# Patient Record
Sex: FEMALE | Race: WHITE | NOT HISPANIC OR LATINO
[De-identification: names, ages, dates, MRNs, and addresses within clinical notes are randomized per-mention and may not be internally consistent; named-entity substitution may affect disease eponyms.]

---

## 2020-08-31 ENCOUNTER — TRANSCRIPTION ENCOUNTER (OUTPATIENT)
Age: 55
End: 2020-08-31

## 2020-11-21 ENCOUNTER — TRANSCRIPTION ENCOUNTER (OUTPATIENT)
Age: 55
End: 2020-11-21

## 2021-11-10 ENCOUNTER — TRANSCRIPTION ENCOUNTER (OUTPATIENT)
Age: 56
End: 2021-11-10

## 2022-07-08 ENCOUNTER — APPOINTMENT (OUTPATIENT)
Dept: ORTHOPEDIC SURGERY | Facility: CLINIC | Age: 57
End: 2022-07-08

## 2022-07-08 VITALS — HEIGHT: 65 IN | BODY MASS INDEX: 19.99 KG/M2 | WEIGHT: 120 LBS

## 2022-07-08 DIAGNOSIS — Z78.9 OTHER SPECIFIED HEALTH STATUS: ICD-10-CM

## 2022-07-08 PROBLEM — Z00.00 ENCOUNTER FOR PREVENTIVE HEALTH EXAMINATION: Status: ACTIVE | Noted: 2022-07-08

## 2022-07-08 PROCEDURE — 99214 OFFICE O/P EST MOD 30 MIN: CPT | Mod: 25

## 2022-07-08 PROCEDURE — 23600 CLTX PROX HUMRL FX W/O MNPJ: CPT

## 2022-07-08 RX ORDER — OXYCODONE AND ACETAMINOPHEN 5; 325 MG/1; MG/1
5-325 TABLET ORAL
Refills: 0 | Status: ACTIVE | COMMUNITY
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
## 2022-07-08 NOTE — ASSESSMENT
[FreeTextEntry1] : NWB right UE \par Use sling\par Come out of sling a few times a day to perform elbow and wrist ROM to prevent stiffness \par Follow up in 1 month

## 2022-07-08 NOTE — IMAGING
[Right] : right shoulder [Fracture] : Fracture [The fracture is in acceptable alignment. There is progression in healing seen] : The fracture is in acceptable alignment. There is progression in healing seen

## 2022-08-02 ENCOUNTER — APPOINTMENT (OUTPATIENT)
Dept: ORTHOPEDIC SURGERY | Facility: CLINIC | Age: 57
End: 2022-08-02

## 2022-08-02 VITALS — HEIGHT: 65 IN | WEIGHT: 120 LBS | BODY MASS INDEX: 19.99 KG/M2

## 2022-08-02 PROCEDURE — 99024 POSTOP FOLLOW-UP VISIT: CPT

## 2022-08-02 PROCEDURE — 73030 X-RAY EXAM OF SHOULDER: CPT | Mod: RT

## 2022-08-02 RX ORDER — TRAMADOL HYDROCHLORIDE 50 MG/1
50 TABLET, COATED ORAL DAILY
Qty: 10 | Refills: 0 | Status: ACTIVE | COMMUNITY
Start: 2022-08-02 | End: 1900-01-01

## 2022-08-02 NOTE — PHYSICAL EXAM
[] : motor and sensory intact distally [Right] : right shoulder [The fracture is in acceptable alignment. There is progression in healing seen] : The fracture is in acceptable alignment. There is progression in healing seen [FreeTextEntry9] : A [de-identified] : Strength testing limited due to Fx/immobilization  [TWNoteComboBox7] : active forward flexion 45 degrees [TWNoteComboBox6] : internal rotation lateral hip [de-identified] : external rotation 10 degrees

## 2022-08-02 NOTE — DISCUSSION/SUMMARY
[de-identified] : Patient shown HEP including passive arm circles. Patient can begin PT in 1 week working on passive ROM. Take NSAIDs and refrain from lifting. wear sling in public spaces.  \par \par f/u 4 weeks
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
## 2022-08-11 ENCOUNTER — APPOINTMENT (OUTPATIENT)
Dept: ORTHOPEDIC SURGERY | Facility: CLINIC | Age: 57
End: 2022-08-11

## 2022-08-18 ENCOUNTER — FORM ENCOUNTER (OUTPATIENT)
Age: 57
End: 2022-08-18

## 2022-08-18 RX ORDER — OXYCODONE AND ACETAMINOPHEN 5; 325 MG/1; MG/1
5-325 TABLET ORAL
Qty: 28 | Refills: 0 | Status: ACTIVE | COMMUNITY
Start: 2022-08-18 | End: 1900-01-01

## 2022-09-08 ENCOUNTER — APPOINTMENT (OUTPATIENT)
Dept: ORTHOPEDIC SURGERY | Facility: CLINIC | Age: 57
End: 2022-09-08

## 2022-09-08 PROCEDURE — 73030 X-RAY EXAM OF SHOULDER: CPT | Mod: RT

## 2022-09-08 RX ORDER — MELOXICAM 15 MG/1
15 TABLET ORAL
Qty: 30 | Refills: 0 | Status: ACTIVE | COMMUNITY
Start: 2022-09-08 | End: 1900-01-01
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
## 2022-09-08 NOTE — PHYSICAL EXAM
[Right] : right shoulder [The fracture is in acceptable alignment. There is progression in healing seen] : The fracture is in acceptable alignment. There is progression in healing seen [4 ___] : forward flexion 4[unfilled]/5 [4___] : internal rotation 4[unfilled]/5 [] : no scapular winging [TWNoteComboBox7] : active forward flexion 130 degrees [TWNoteComboBox6] : internal rotation L4 [de-identified] : external rotation 40 degrees

## 2022-09-08 NOTE — DISCUSSION/SUMMARY
[Medication Risks Reviewed] : Medication risks reviewed [Surgical risks reviewed] : Surgical risks reviewed [de-identified] : Discussed importance of non-impact exercise and muscle stretching before and after exercise.  Explained the importance of ice and rest.\par continue PT

## 2022-10-11 ENCOUNTER — APPOINTMENT (OUTPATIENT)
Dept: ORTHOPEDIC SURGERY | Facility: CLINIC | Age: 57
End: 2022-10-11

## 2022-10-11 VITALS — HEIGHT: 65 IN | BODY MASS INDEX: 19.99 KG/M2 | WEIGHT: 120 LBS

## 2022-10-11 PROCEDURE — 99213 OFFICE O/P EST LOW 20 MIN: CPT

## 2022-10-11 NOTE — DISCUSSION/SUMMARY
[de-identified] : Discussed importance of non-impact exercise and muscle stretching before and after exercise. Explained the importance of ice/heat and rest. Patient will transition PT exercises into HEP - increase ROM and strength in the upper extremity. F/u in 6 weeks.

## 2022-10-11 NOTE — REASON FOR VISIT
[FreeTextEntry2] : here today for f/u right shoulder fx 7/7/22.  going to PT off and on d/t financial reasons

## 2022-10-11 NOTE — PHYSICAL EXAM
[5 ___] : forward flexion 5[unfilled]/5 [5___] : internal rotation 5[unfilled]/5 [Right] : right shoulder [The fracture is in acceptable alignment. There is progression in healing seen] : The fracture is in acceptable alignment. There is progression in healing seen [] : no scapular winging [FreeTextEntry9] : forward elevating 150  [de-identified] : biceps 5-/5 [TWNoteComboBox7] : False [TWNoteComboBox6] : internal rotation L5 [de-identified] : external rotation 40 degrees

## 2022-11-22 ENCOUNTER — APPOINTMENT (OUTPATIENT)
Dept: ORTHOPEDIC SURGERY | Facility: CLINIC | Age: 57
End: 2022-11-22

## 2022-11-22 VITALS — WEIGHT: 120 LBS | HEIGHT: 65 IN | BODY MASS INDEX: 19.99 KG/M2

## 2022-11-22 PROCEDURE — 99213 OFFICE O/P EST LOW 20 MIN: CPT

## 2022-11-22 NOTE — REASON FOR VISIT
[FreeTextEntry2] : here today for f/u right shoulder fx 7/7/22.  has been doing exercises on her own . here for motion check

## 2022-11-22 NOTE — DISCUSSION/SUMMARY
[de-identified] : Discussed importance of non-impact exercise and muscle stretching before and after exercise. Explained the importance of ice/heat and rest. Patient has transitioned PT exercises into HEP - she will continue to work to increase ROM and strength in the upper extremity. \par Demonstrated wall exercises to benefit the shoulder. Advised her to take the exercises slow.\par F/u 6-8 weeks

## 2022-11-22 NOTE — PHYSICAL EXAM
[5___] : right grasp 5[unfilled]/5 [Right] : right shoulder [4 ___] : forward flexion 4[unfilled]/5 [4___] : internal rotation 4[unfilled]/5 [] : no scapular winging [FreeTextEntry8] : tender over levito scapula up into the neck [FreeTextEntry9] : passive IR L1; active IR L4 [de-identified] : biceps 4-/5 [TWNoteComboBox7] : active forward flexion 150 degrees [TWNoteComboBox6] : internal rotation L4 [de-identified] : external rotation 70 degrees

## 2023-01-17 ENCOUNTER — APPOINTMENT (OUTPATIENT)
Dept: ORTHOPEDIC SURGERY | Facility: CLINIC | Age: 58
End: 2023-01-17
Payer: COMMERCIAL

## 2023-01-17 VITALS — BODY MASS INDEX: 19.99 KG/M2 | HEIGHT: 65 IN | WEIGHT: 120 LBS

## 2023-01-17 DIAGNOSIS — S42.291A OTHER DISPLACED FRACTURE OF UPPER END OF RIGHT HUMERUS, INITIAL ENCOUNTER FOR CLOSED FRACTURE: ICD-10-CM

## 2023-01-17 PROCEDURE — 99213 OFFICE O/P EST LOW 20 MIN: CPT

## 2023-01-17 NOTE — REASON FOR VISIT
[FreeTextEntry2] : here today for f/u right shoulder fx 7/7/22.  has been doing HEP and going to gym.  c/o some stiffness

## 2023-01-17 NOTE — PHYSICAL EXAM
[Right] : right shoulder [4 ___] : forward flexion 4[unfilled]/5 [4___] : abduction 4[unfilled]/5 [5___] : internal rotation 5[unfilled]/5 [] : no scapular winging [FreeTextEntry8] : tender over levitor scapula up into the neck [FreeTextEntry9] :  active IR T9 [de-identified] : biceps 4-/5 [TWNoteComboBox7] : active forward flexion 175 degrees [TWNoteComboBox6] : False [de-identified] : external rotation 80 degrees

## 2023-04-18 ENCOUNTER — APPOINTMENT (OUTPATIENT)
Dept: ORTHOPEDIC SURGERY | Facility: CLINIC | Age: 58
End: 2023-04-18

## 2024-06-14 ENCOUNTER — TRANSCRIPTION ENCOUNTER (OUTPATIENT)
Age: 59
End: 2024-06-14

## 2024-06-17 ENCOUNTER — APPOINTMENT (OUTPATIENT)
Dept: ORTHOPEDIC SURGERY | Facility: CLINIC | Age: 59
End: 2024-06-17

## 2025-06-10 ENCOUNTER — APPOINTMENT (OUTPATIENT)
Facility: CLINIC | Age: 60
End: 2025-06-10
Payer: MEDICAID

## 2025-06-10 PROBLEM — I47.20 VENTRICULAR TACHYCARDIA BY ELECTROCARDIOGRAM: Status: RESOLVED | Noted: 2025-06-10 | Resolved: 2025-06-10

## 2025-06-10 PROBLEM — R73.03 PRE-DIABETES: Status: ACTIVE | Noted: 2025-06-10

## 2025-06-10 PROCEDURE — 93000 ELECTROCARDIOGRAM COMPLETE: CPT

## 2025-06-10 PROCEDURE — 99203 OFFICE O/P NEW LOW 30 MIN: CPT | Mod: 25

## 2025-06-10 RX ORDER — AMIODARONE HYDROCHLORIDE 200 MG/1
200 TABLET ORAL EVERY OTHER DAY
Refills: 0 | Status: ACTIVE | COMMUNITY

## 2025-06-11 RX ORDER — METOPROLOL TARTRATE 25 MG/1
25 TABLET ORAL
Qty: 30 | Refills: 0 | Status: ACTIVE | COMMUNITY
Start: 2025-06-11 | End: 1900-01-01

## 2025-06-30 NOTE — H&P PST ADULT - OTHER CARE PROVIDERS
Steve Turner ([facility] [place], [address], [place] [address], Phone: [phone], Fax: [phone]), Moises Sarmiento ([facility], [address], [place] [address], [phone], Fax: [phone])

## 2025-06-30 NOTE — H&P PST ADULT - HISTORY OF PRESENT ILLNESS
Heart Failure:        Systolic/Diastolic/Combined:        NYHA Class (within 2 weeks):     Echo: May 27 2025  ·	LEFT VENTRICLE: Normal size left ventricle. Normal global left ventricular systolic function. EF evaluated by EF (biplane method of disks). Biplane LVEF is calculated at 64 %. The LV wall thickness is normal. The left ventricular wall motion is normal. Normal diastolic LV function.  ·	IVS: Interventricular septal thickness is normal.  ·	RIGHT VENTRICLE: Normal size right ventricle. The right ventricular global function is normal. Right ventricular systolic pressure is within the normal range. TAPSE: 3.0 cm.  ·	LEFT ATRIUM: Normal size left atrium. LADs, MM: 2.4 cm. LADs, 2D: 2.8 cm.  ·	IAS: Normal appearing atrial septum. There is no color doppler flow evidence for an ASD or PFO.  ·	RIGHT ATRIUM: Normal size right atrium.  ·	MITRAL VALVE: There is mild mitral valve thickening. Trace mitral valve regurgitation. No mitral valve stenosis.  ·	AORTIC VALVE: The aortic valve is tricuspid, thickened and has normal leaflet excursion . No aortic valve regurgitation. No aortic valve stenosis.  ·	TRICUSPID VALVE: There is mild tricuspid valve thickening. Trace tricuspid valve regurgitation. RVSP: 20 mmHg.  ·	PULMONIC VALVE: The pulmonic valve appears normal in structure and function. No significant pulmonary valve regurgitation.  ·	AORTA: No dilatation of the aorta. The aortic root exhibits normal size. Ascending aorta is normal in size.  ·	GREAT VESSELS: Pulmonary Artery: The pulmonary artery morphology appears normal. IVC: IVC is dilated. There is inspiratory collapse greater than 50% of the IVC.  ·	PERICARDIUM: There is no pericardial effusion. No pleural effusion.    Prior EP Procedures:    Prior IC Procedures: 05/28/2025  Coronary Angiography  ·	LM- patent  ·	LAD- ostial 30% plaque  ·	LCx- patent  ·	RCA- patent. The coronary circulation is right dominant    Prior CTS:         Antiarrhythmic Therapies:      Anticoagulation Therapies:      	  MEDICATIONS:  Home Medications:      Social History:   Marital:   Occupation:   Smoking:   ETOH:   Drugs:   Caffeine:     ROS:   Gen: No fatigue, no fever/chills  HEENT: No hearing loss, no tinnitus, no visual disturbance, no epistaxis.  CV: No chest pain, no palpitations, no RODARTE, no orthopnea, no PND, no edema, no claudication  Respiratory: No dyspnea, no cough, no wheeze  GI: No nausea, no vomiting, no black/bloody stools  : No hematuria, normal frequency  M/S: No myalgias.  Heme: No bruising problems, no bleeding, no anemia, no thrombotic events.  Neuro: No weakness, no paresthesia, no syncope, no seizures      T(C): --  HR: --  BP: --  RR: --  SpO2: --  PHYSICAL EXAM:  Constitutional: A & O x 3  HEENT:   Normal oral mucosa, PERRL, EOMI	  Cardiovascular: Normal S1 S2, No JVD, No murmurs  Respiratory: Lungs clear to auscultation	  Gastrointestinal:  Soft, Non-tender, + BS	  Skin: No rashes, No ecchymoses, No cyanosis  Neurologic: Non-focal  Extremities: No edema, DP: Right: 2+, Left: 2+, Radial: 2+, Left: 2+      ECG:  	    LABS:	 	                             60-year-old female with a past medical history of PVCs who presents for hospital follow up for ventricular arrhythmia. She was initially evaluated for palpitations and PVCs by Dr. Gonzales in 2021 but given the low burden and highly infrequent nature of her symptoms, no further therapy was pursued. She was recently hospitalized at [facility] for symptomatic PVCs and NSVT where she presented with palpitations, chest tightness and lightheadedness on May 26th 2025. She was noted to have 5 hours of incessant runs of NSVT and was given IV amiodarone. She underwent a cardiac catheterization revealing 30% oLAD disease without additional CAD, and a TTE which showed perserved LV and RV function. She was loaded on amiodarone and discharged home, however due to worsening fatigue and bradycardia, she has stopped her amiodarone on 6/8/25 after switching to every other day 1 week prior. No recurrence in palpitations to the severity as before. Denies chest pain, near syncope or syncope.    Heart Failure:        Systolic/Diastolic/Combined:        NYHA Class (within 2 weeks):     Echo: May 27 2025  ·	LEFT VENTRICLE: Normal size left ventricle. Normal global left ventricular systolic function. EF evaluated by EF (biplane method of disks). Biplane LVEF is calculated at 64 %. The LV wall thickness is normal. The left ventricular wall motion is normal. Normal diastolic LV function.  ·	IVS: Interventricular septal thickness is normal.  ·	RIGHT VENTRICLE: Normal size right ventricle. The right ventricular global function is normal. Right ventricular systolic pressure is within the normal range. TAPSE: 3.0 cm.  ·	LEFT ATRIUM: Normal size left atrium. LADs, MM: 2.4 cm. LADs, 2D: 2.8 cm.  ·	IAS: Normal appearing atrial septum. There is no color doppler flow evidence for an ASD or PFO.  ·	RIGHT ATRIUM: Normal size right atrium.  ·	MITRAL VALVE: There is mild mitral valve thickening. Trace mitral valve regurgitation. No mitral valve stenosis.  ·	AORTIC VALVE: The aortic valve is tricuspid, thickened and has normal leaflet excursion . No aortic valve regurgitation. No aortic valve stenosis.  ·	TRICUSPID VALVE: There is mild tricuspid valve thickening. Trace tricuspid valve regurgitation. RVSP: 20 mmHg.  ·	PULMONIC VALVE: The pulmonic valve appears normal in structure and function. No significant pulmonary valve regurgitation.  ·	AORTA: No dilatation of the aorta. The aortic root exhibits normal size. Ascending aorta is normal in size.  ·	GREAT VESSELS: Pulmonary Artery: The pulmonary artery morphology appears normal. IVC: IVC is dilated. There is inspiratory collapse greater than 50% of the IVC.  ·	PERICARDIUM: There is no pericardial effusion. No pleural effusion.    Prior EP Procedures: N/A    Prior IC Procedures: 05/28/2025  Coronary Angiography  ·	LM- patent  ·	LAD- ostial 30% plaque  ·	LCx- patent  ·	RCA- patent. The coronary circulation is right dominant    Prior CTS:         Antiarrhythmic Therapies:   ·	Metoprolol 25 mg every 6 hours as need for palpitations on the day of procedure     Anticoagulation Therapies:  N/A  	  MEDICATIONS:  Home Medications:      Social History:   Marital:   Occupation:   Smoking:   ETOH:   Drugs:   Caffeine:     ROS:   Gen: No fatigue, no fever/chills  HEENT: No hearing loss, no tinnitus, no visual disturbance, no epistaxis.  CV: No chest pain, no palpitations, no RODARTE, no orthopnea, no PND, no edema, no claudication  Respiratory: No dyspnea, no cough, no wheeze  GI: No nausea, no vomiting, no black/bloody stools  : No hematuria, normal frequency  M/S: No myalgias.  Heme: No bruising problems, no bleeding, no anemia, no thrombotic events.  Neuro: No weakness, no paresthesia, no syncope, no seizures      T(C): --  HR: --  BP: --  RR: --  SpO2: --  PHYSICAL EXAM:  Constitutional: A & O x 3  HEENT:   Normal oral mucosa, PERRL, EOMI	  Cardiovascular: Normal S1 S2, No JVD, No murmurs  Respiratory: Lungs clear to auscultation	  Gastrointestinal:  Soft, Non-tender, + BS	  Skin: No rashes, No ecchymoses, No cyanosis  Neurologic: Non-focal  Extremities: No edema, DP: Right: 2+, Left: 2+, Radial: 2+, Left: 2+      ECG:  	    LABS:	 	                             60-year-old female never smoker with a past medical history of PVCs who presents for hospital follow up for ventricular arrhythmia. She was initially evaluated for palpitations and PVCs by Dr. Gonzales in 2021 but given the low burden and highly infrequent nature of her symptoms, no further therapy was pursued. She was recently hospitalized at [facility] for symptomatic PVCs and NSVT where she presented with palpitations, chest tightness and lightheadedness on May 26th 2025. She was noted to have 5 hours of incessant runs of NSVT and was given IV amiodarone. She underwent a cardiac catheterization revealing 30% oLAD disease without additional CAD, and a TTE which showed preserved LV and RV function. She was loaded on amiodarone and discharged home, however due to worsening fatigue and bradycardia, she has stopped her amiodarone on 6/8/25 after switching to every other day 1 week prior. No recurrence in palpitations to the severity as before. Denies chest pain, SOB, edema or orthopnea, but admits to fatigue, palpitations, and occasional near syncope with NSVT. She has off all medications for 1 month.    Heart Failure: N/A    Echo: May 27 2025  ·	LEFT VENTRICLE: Normal size left ventricle. Normal global left ventricular systolic function. EF evaluated by EF (biplane method of disks). Biplane LVEF is calculated at 64 %. The LV wall thickness is normal. The left ventricular wall motion is normal. Normal diastolic LV function.  ·	IVS: Interventricular septal thickness is normal.  ·	RIGHT VENTRICLE: Normal size right ventricle. The right ventricular global function is normal. Right ventricular systolic pressure is within the normal range. TAPSE: 3.0 cm.  ·	LEFT ATRIUM: Normal size left atrium. LADs, MM: 2.4 cm. LADs, 2D: 2.8 cm.  ·	IAS: Normal appearing atrial septum. There is no color doppler flow evidence for an ASD or PFO.  ·	RIGHT ATRIUM: Normal size right atrium.  ·	MITRAL VALVE: There is mild mitral valve thickening. Trace mitral valve regurgitation. No mitral valve stenosis.  ·	AORTIC VALVE: The aortic valve is tricuspid, thickened and has normal leaflet excursion . No aortic valve regurgitation. No aortic valve stenosis.  ·	TRICUSPID VALVE: There is mild tricuspid valve thickening. Trace tricuspid valve regurgitation. RVSP: 20 mmHg.  ·	PULMONIC VALVE: The pulmonic valve appears normal in structure and function. No significant pulmonary valve regurgitation.  ·	AORTA: No dilatation of the aorta. The aortic root exhibits normal size. Ascending aorta is normal in size.  ·	GREAT VESSELS: Pulmonary Artery: The pulmonary artery morphology appears normal. IVC: IVC is dilated. There is inspiratory collapse greater than 50% of the IVC.  ·	PERICARDIUM: There is no pericardial effusion. No pleural effusion.    Prior EP Procedures: N/A    Prior IC Procedures: 05/28/2025  Coronary Angiography  ·	LM- patent  ·	LAD- ostial 30% plaque  ·	LCx- patent  ·	RCA- patent. The coronary circulation is right dominant    Prior CTS: N/A         Antiarrhythmic Therapies: N/A     Anticoagulation Therapies:  N/A  	  MEDICATIONS:  Home Medications: None    Social History:   ·	Marital: , lives with .  ·	Occupation: Retired .  ·	Smoking: Never smoker  ·	ETOH: Occasional  ·	Drugs: Deneis  ·	Caffeine: Decaf    ROS:   Gen: + fatigue, no fever/chills  HEENT: No hearing loss, no tinnitus, no visual disturbance, no epistaxis.  CV: No chest pain, + palpitations, no RODARTE, no orthopnea, no PND, no edema, no claudication  Respiratory: No dyspnea, no cough, no wheeze  GI: No nausea, no vomiting, no black/bloody stools  : No hematuria, normal frequency  M/S: No myalgias.  Heme: No bruising problems, no bleeding, no anemia, no thrombotic events.  Neuro: No weakness, no paresthesia, + near syncope, no seizures    T(F): 97.1  HR: 55  BP: 116/68  RR: 18  SpO2: 99%  PHYSICAL EXAM:  Constitutional: A & O x 3  HEENT:   Normal oral mucosa, PERRL, EOMI	  Cardiovascular: Normal S1 S2, No JVD, No murmurs  Respiratory: Lungs clear to auscultation	  Gastrointestinal:  Soft, Non-tender, + BS	  Skin: No rashes, No ecchymoses, No cyanosis  Neurologic: Non-focal  Extremities: No edema, DP: Right: 2+, Left: 2+, Radial: 2+, Left: 2+    ECG: SB    LABS:	 	                             60-year-old female never smoker with a past medical history of PVCs who presents for hospital follow up for ventricular arrhythmia. She was initially evaluated for palpitations and PVCs by Dr. Gonzales in 2021 but given the low burden and highly infrequent nature of her symptoms, no further therapy was pursued. She was recently hospitalized at [facility] for symptomatic PVCs and NSVT where she presented with palpitations, chest tightness and lightheadedness on May 26th 2025. She was noted to have 5 hours of incessant runs of NSVT and was given IV amiodarone. She underwent a cardiac catheterization revealing 30% oLAD disease without additional CAD, and a TTE which showed preserved LV and RV function. She was loaded on amiodarone and discharged home, however due to worsening fatigue and bradycardia, she has stopped her amiodarone on 6/8/25 after switching to every other day 1 week prior. No recurrence in palpitations to the severity as before. Denies chest pain, SOB, edema or orthopnea, but admits to fatigue, palpitations, and occasional near syncope with NSVT. She has off all medications for 1 month.    Heart Failure: N/A    Echo: May 27 2025  ·	LEFT VENTRICLE: Normal size left ventricle. Normal global left ventricular systolic function. EF evaluated by EF (biplane method of disks). Biplane LVEF is calculated at 64 %. The LV wall thickness is normal. The left ventricular wall motion is normal. Normal diastolic LV function.  ·	IVS: Interventricular septal thickness is normal.  ·	RIGHT VENTRICLE: Normal size right ventricle. The right ventricular global function is normal. Right ventricular systolic pressure is within the normal range. TAPSE: 3.0 cm.  ·	LEFT ATRIUM: Normal size left atrium. LADs, MM: 2.4 cm. LADs, 2D: 2.8 cm.  ·	IAS: Normal appearing atrial septum. There is no color doppler flow evidence for an ASD or PFO.  ·	RIGHT ATRIUM: Normal size right atrium.  ·	MITRAL VALVE: There is mild mitral valve thickening. Trace mitral valve regurgitation. No mitral valve stenosis.  ·	AORTIC VALVE: The aortic valve is tricuspid, thickened and has normal leaflet excursion . No aortic valve regurgitation. No aortic valve stenosis.  ·	TRICUSPID VALVE: There is mild tricuspid valve thickening. Trace tricuspid valve regurgitation. RVSP: 20 mmHg.  ·	PULMONIC VALVE: The pulmonic valve appears normal in structure and function. No significant pulmonary valve regurgitation.  ·	AORTA: No dilatation of the aorta. The aortic root exhibits normal size. Ascending aorta is normal in size.  ·	GREAT VESSELS: Pulmonary Artery: The pulmonary artery morphology appears normal. IVC: IVC is dilated. There is inspiratory collapse greater than 50% of the IVC.  ·	PERICARDIUM: There is no pericardial effusion. No pleural effusion.    Prior EP Procedures: N/A    Prior IC Procedures: 05/28/2025  Coronary Angiography  ·	LM- patent  ·	LAD- ostial 30% plaque  ·	LCx- patent  ·	RCA- patent. The coronary circulation is right dominant    Prior CTS: N/A         Antiarrhythmic Therapies: N/A     Anticoagulation Therapies:  N/A  	  MEDICATIONS:  Home Medications: None    Social History:   ·	Marital: , lives with .  ·	Occupation: Retired .  ·	Smoking: Never smoker  ·	ETOH: Occasional  ·	Drugs: Deneis  ·	Caffeine: Decaf    ROS:   Gen: + fatigue, no fever/chills  HEENT: No hearing loss, no tinnitus, no visual disturbance, no epistaxis.  CV: No chest pain, + palpitations, no RODARTE, no orthopnea, no PND, no edema, no claudication  Respiratory: No dyspnea, no cough, no wheeze  GI: No nausea, no vomiting, no black/bloody stools  : No hematuria, normal frequency  M/S: No myalgias.  Heme: No bruising problems, no bleeding, no anemia, no thrombotic events.  Neuro: No weakness, no paresthesia, + near syncope, no seizures    T(F): 97.1  HR: 55  BP: 116/68  RR: 18  SpO2: 99%  PHYSICAL EXAM:  Constitutional: A & O x 3  HEENT:   Normal oral mucosa, PERRL, EOMI	  Cardiovascular: Normal S1 S2, No JVD, No murmurs  Respiratory: Lungs clear to auscultation	  Gastrointestinal:  Soft, Non-tender, + BS	  Skin: No rashes, No ecchymoses, No cyanosis  Neurologic: Non-focal  Extremities: No edema, DP: Right: 2+, Left: 2+, Radial: 2+, Left: 2+    ECG: SB    LABS:	 	                        12.0   3.67  )-----------( 280      ( 01 Jul 2025 13:40 )             36.0     07-01    132[L]  |  96  |  6.8[L]  ----------------------------<  78  4.3   |  24.0  |  0.60    Ca    8.9      01 Jul 2025 13:40  Phos  4.2     07-01  Mg     2.1     07-01    TPro  6.8  /  Alb  4.4  /  TBili  0.9  /  DBili  x   /  AST  19  /  ALT  10  /  AlkPhos  57  07-01    PT/INR - ( 01 Jul 2025 13:40 )   PT: 11.1 sec;   INR: 0.96 ratio     PTT - ( 01 Jul 2025 13:40 )  PTT:37.8 sec

## 2025-06-30 NOTE — H&P PST ADULT - ASSESSMENT
1. NSVT  Procedure: VT/PVC ablation  Procedure Date: July 9, 2025  Proceduralist: Steve Turner  Procedure to be explained and consent obtained on day of procedure.  NPO after midnight prior with exception of sip of water with morning medications.  Anticoagulation:   Anti-arrhythmic: Will hold metoprolol 25 mg every 6 hours as need for palpitations on the day of procedure  Pre-procedure instructions provided (verbal & written) 1. NSVT  ·	Procedure: VT/PVC ablation  ·	Procedure Date: July 9, 2025  ·	Proceduralist: Steve Turner  ·	Procedure to be explained and consent obtained on day of procedure.  ·	NPO after midnight prior with exception of sip of water with morning medications.  ·	Anticoagulation: N/A  ·	Anti-arrhythmic: N/A  ·	Pre-procedure instructions provided (verbal & written)

## 2025-06-30 NOTE — H&P PST ADULT - NSICDXPROCEDURE_GEN_ALL_CORE_FT
PROCEDURES:  Radiofreqency ablation, arrhythmogenic focus, for ventricular tachycardia 01-Jul-2025 08:12:29  Nathan Nicholson

## 2025-07-01 ENCOUNTER — APPOINTMENT (OUTPATIENT)
Facility: CLINIC | Age: 60
End: 2025-07-01

## 2025-07-01 ENCOUNTER — OUTPATIENT (OUTPATIENT)
Dept: OUTPATIENT SERVICES | Facility: HOSPITAL | Age: 60
LOS: 1 days | End: 2025-07-01
Payer: COMMERCIAL

## 2025-07-01 DIAGNOSIS — Z98.890 OTHER SPECIFIED POSTPROCEDURAL STATES: Chronic | ICD-10-CM

## 2025-07-01 DIAGNOSIS — I47.20 VENTRICULAR TACHYCARDIA, UNSPECIFIED: ICD-10-CM

## 2025-07-01 DIAGNOSIS — Z98.891 HISTORY OF UTERINE SCAR FROM PREVIOUS SURGERY: Chronic | ICD-10-CM

## 2025-07-01 DIAGNOSIS — Z01.818 ENCOUNTER FOR OTHER PREPROCEDURAL EXAMINATION: ICD-10-CM

## 2025-07-01 DIAGNOSIS — S82.91XS UNSPECIFIED FRACTURE OF RIGHT LOWER LEG, SEQUELA: Chronic | ICD-10-CM

## 2025-07-01 LAB
ALBUMIN SERPL ELPH-MCNC: 4.4 G/DL — SIGNIFICANT CHANGE UP (ref 3.3–5.2)
ALP SERPL-CCNC: 57 U/L — SIGNIFICANT CHANGE UP (ref 40–120)
ALT FLD-CCNC: 10 U/L — SIGNIFICANT CHANGE UP
ANION GAP SERPL CALC-SCNC: 12 MMOL/L — SIGNIFICANT CHANGE UP (ref 5–17)
APTT BLD: 37.8 SEC — HIGH (ref 26.1–36.8)
AST SERPL-CCNC: 19 U/L — SIGNIFICANT CHANGE UP
BASOPHILS # BLD AUTO: 0.03 K/UL — SIGNIFICANT CHANGE UP (ref 0–0.2)
BASOPHILS NFR BLD AUTO: 0.8 % — SIGNIFICANT CHANGE UP (ref 0–2)
BILIRUB SERPL-MCNC: 0.9 MG/DL — SIGNIFICANT CHANGE UP (ref 0.4–2)
BLD GP AB SCN SERPL QL: SIGNIFICANT CHANGE UP
BUN SERPL-MCNC: 6.8 MG/DL — LOW (ref 8–20)
CALCIUM SERPL-MCNC: 8.9 MG/DL — SIGNIFICANT CHANGE UP (ref 8.4–10.5)
CHLORIDE SERPL-SCNC: 96 MMOL/L — SIGNIFICANT CHANGE UP (ref 96–108)
CO2 SERPL-SCNC: 24 MMOL/L — SIGNIFICANT CHANGE UP (ref 22–29)
CREAT SERPL-MCNC: 0.6 MG/DL — SIGNIFICANT CHANGE UP (ref 0.5–1.3)
EGFR: 102 ML/MIN/1.73M2 — SIGNIFICANT CHANGE UP
EGFR: 102 ML/MIN/1.73M2 — SIGNIFICANT CHANGE UP
EOSINOPHIL # BLD AUTO: 0.07 K/UL — SIGNIFICANT CHANGE UP (ref 0–0.5)
EOSINOPHIL NFR BLD AUTO: 1.9 % — SIGNIFICANT CHANGE UP (ref 0–6)
GLUCOSE SERPL-MCNC: 78 MG/DL — SIGNIFICANT CHANGE UP (ref 70–99)
HCT VFR BLD CALC: 36 % — SIGNIFICANT CHANGE UP (ref 34.5–45)
HGB BLD-MCNC: 12 G/DL — SIGNIFICANT CHANGE UP (ref 11.5–15.5)
IMM GRANULOCYTES # BLD AUTO: 0.01 K/UL — SIGNIFICANT CHANGE UP (ref 0–0.07)
IMM GRANULOCYTES NFR BLD AUTO: 0.3 % — SIGNIFICANT CHANGE UP (ref 0–0.9)
INR BLD: 0.96 RATIO — SIGNIFICANT CHANGE UP (ref 0.85–1.16)
LYMPHOCYTES # BLD AUTO: 1.29 K/UL — SIGNIFICANT CHANGE UP (ref 1–3.3)
LYMPHOCYTES NFR BLD AUTO: 35.1 % — SIGNIFICANT CHANGE UP (ref 13–44)
MAGNESIUM SERPL-MCNC: 2.1 MG/DL — SIGNIFICANT CHANGE UP (ref 1.6–2.6)
MCHC RBC-ENTMCNC: 30.7 PG — SIGNIFICANT CHANGE UP (ref 27–34)
MCHC RBC-ENTMCNC: 33.3 G/DL — SIGNIFICANT CHANGE UP (ref 32–36)
MCV RBC AUTO: 92.1 FL — SIGNIFICANT CHANGE UP (ref 80–100)
MONOCYTES # BLD AUTO: 0.34 K/UL — SIGNIFICANT CHANGE UP (ref 0–0.9)
MONOCYTES NFR BLD AUTO: 9.3 % — SIGNIFICANT CHANGE UP (ref 2–14)
NEUTROPHILS # BLD AUTO: 1.93 K/UL — SIGNIFICANT CHANGE UP (ref 1.8–7.4)
NEUTROPHILS NFR BLD AUTO: 52.6 % — SIGNIFICANT CHANGE UP (ref 43–77)
NRBC # BLD AUTO: 0 K/UL — SIGNIFICANT CHANGE UP (ref 0–0)
NRBC # FLD: 0 K/UL — SIGNIFICANT CHANGE UP (ref 0–0)
NRBC BLD AUTO-RTO: 0 /100 WBCS — SIGNIFICANT CHANGE UP (ref 0–0)
PHOSPHATE SERPL-MCNC: 4.2 MG/DL — SIGNIFICANT CHANGE UP (ref 2.4–4.7)
PLATELET # BLD AUTO: 280 K/UL — SIGNIFICANT CHANGE UP (ref 150–400)
PMV BLD: 9.5 FL — SIGNIFICANT CHANGE UP (ref 7–13)
POTASSIUM SERPL-MCNC: 4.3 MMOL/L — SIGNIFICANT CHANGE UP (ref 3.5–5.3)
POTASSIUM SERPL-SCNC: 4.3 MMOL/L — SIGNIFICANT CHANGE UP (ref 3.5–5.3)
PROT SERPL-MCNC: 6.8 G/DL — SIGNIFICANT CHANGE UP (ref 6.6–8.7)
PROTHROM AB SERPL-ACNC: 11.1 SEC — SIGNIFICANT CHANGE UP (ref 9.9–13.4)
RBC # BLD: 3.91 M/UL — SIGNIFICANT CHANGE UP (ref 3.8–5.2)
RBC # FLD: 12.1 % — SIGNIFICANT CHANGE UP (ref 10.3–14.5)
SODIUM SERPL-SCNC: 132 MMOL/L — LOW (ref 135–145)
WBC # BLD: 3.67 K/UL — LOW (ref 3.8–10.5)
WBC # FLD AUTO: 3.67 K/UL — LOW (ref 3.8–10.5)

## 2025-07-01 PROCEDURE — 86901 BLOOD TYPING SEROLOGIC RH(D): CPT

## 2025-07-01 PROCEDURE — 85025 COMPLETE CBC W/AUTO DIFF WBC: CPT

## 2025-07-01 PROCEDURE — 86900 BLOOD TYPING SEROLOGIC ABO: CPT

## 2025-07-01 PROCEDURE — 84100 ASSAY OF PHOSPHORUS: CPT

## 2025-07-01 PROCEDURE — 36415 COLL VENOUS BLD VENIPUNCTURE: CPT

## 2025-07-01 PROCEDURE — 86850 RBC ANTIBODY SCREEN: CPT

## 2025-07-01 PROCEDURE — 85730 THROMBOPLASTIN TIME PARTIAL: CPT

## 2025-07-01 PROCEDURE — 83735 ASSAY OF MAGNESIUM: CPT

## 2025-07-01 PROCEDURE — 93010 ELECTROCARDIOGRAM REPORT: CPT

## 2025-07-01 PROCEDURE — 85610 PROTHROMBIN TIME: CPT

## 2025-07-01 PROCEDURE — 80053 COMPREHEN METABOLIC PANEL: CPT

## 2025-07-01 PROCEDURE — 93005 ELECTROCARDIOGRAM TRACING: CPT

## 2025-07-01 PROCEDURE — G0463: CPT

## 2025-07-09 ENCOUNTER — OUTPATIENT (OUTPATIENT)
Dept: OUTPATIENT SERVICES | Facility: HOSPITAL | Age: 60
LOS: 1 days | End: 2025-07-09
Payer: COMMERCIAL

## 2025-07-09 ENCOUNTER — TRANSCRIPTION ENCOUNTER (OUTPATIENT)
Age: 60
End: 2025-07-09

## 2025-07-09 VITALS
HEART RATE: 62 BPM | DIASTOLIC BLOOD PRESSURE: 77 MMHG | OXYGEN SATURATION: 97 % | RESPIRATION RATE: 16 BRPM | SYSTOLIC BLOOD PRESSURE: 112 MMHG

## 2025-07-09 VITALS
DIASTOLIC BLOOD PRESSURE: 74 MMHG | TEMPERATURE: 98 F | OXYGEN SATURATION: 99 % | HEIGHT: 65 IN | WEIGHT: 119.93 LBS | SYSTOLIC BLOOD PRESSURE: 122 MMHG | RESPIRATION RATE: 12 BRPM | HEART RATE: 48 BPM

## 2025-07-09 DIAGNOSIS — I47.20 VENTRICULAR TACHYCARDIA, UNSPECIFIED: ICD-10-CM

## 2025-07-09 DIAGNOSIS — Z98.891 HISTORY OF UTERINE SCAR FROM PREVIOUS SURGERY: Chronic | ICD-10-CM

## 2025-07-09 DIAGNOSIS — Z98.890 OTHER SPECIFIED POSTPROCEDURAL STATES: Chronic | ICD-10-CM

## 2025-07-09 DIAGNOSIS — S82.91XS UNSPECIFIED FRACTURE OF RIGHT LOWER LEG, SEQUELA: Chronic | ICD-10-CM

## 2025-07-09 LAB — ABO RH CONFIRMATION: SIGNIFICANT CHANGE UP

## 2025-07-09 PROCEDURE — C1894: CPT

## 2025-07-09 PROCEDURE — C1732: CPT

## 2025-07-09 PROCEDURE — 93623 PRGRMD STIMJ&PACG IV RX NFS: CPT

## 2025-07-09 PROCEDURE — 93005 ELECTROCARDIOGRAM TRACING: CPT

## 2025-07-09 PROCEDURE — 93654 COMPRE EP EVAL TX VT: CPT

## 2025-07-09 PROCEDURE — 36415 COLL VENOUS BLD VENIPUNCTURE: CPT

## 2025-07-09 PROCEDURE — 93010 ELECTROCARDIOGRAM REPORT: CPT

## 2025-07-09 PROCEDURE — 93623 PRGRMD STIMJ&PACG IV RX NFS: CPT | Mod: 26

## 2025-07-09 PROCEDURE — C1760: CPT

## 2025-07-09 PROCEDURE — C1730: CPT

## 2025-07-09 RX ORDER — ASPIRIN 325 MG
81 TABLET ORAL DAILY
Refills: 0 | Status: DISCONTINUED | OUTPATIENT
Start: 2025-07-09 | End: 2025-07-23

## 2025-07-09 RX ORDER — ONDANSETRON HCL/PF 4 MG/2 ML
4 VIAL (ML) INJECTION EVERY 4 HOURS
Refills: 0 | Status: DISCONTINUED | OUTPATIENT
Start: 2025-07-09 | End: 2025-07-23

## 2025-07-09 RX ORDER — ASPIRIN 325 MG
1 TABLET ORAL
Qty: 30 | Refills: 0
Start: 2025-07-09 | End: 2025-08-07

## 2025-07-09 NOTE — DISCHARGE NOTE NURSING/CASE MANAGEMENT/SOCIAL WORK - PATIENT PORTAL LINK FT
You can access the FollowMyHealth Patient Portal offered by [facility] by registering at the following website: [email or web address]. By joining [facility]’s FollowMyHealth portal, you will also be able to view your health information using other applications (apps) compatible with our system.

## 2025-07-09 NOTE — DISCHARGE NOTE NURSING/CASE MANAGEMENT/SOCIAL WORK - FINANCIAL ASSISTANCE
[facility] provides services at a reduced cost to those who are determined to be eligible through [facility]’s financial assistance program. Information regarding [facility]’s financial assistance program can be found by going to [email or web address] or by calling [phone].

## 2025-07-09 NOTE — PROGRESS NOTE ADULT - SUBJECTIVE AND OBJECTIVE BOX
60-year-old female never smoker with a past medical history of palpitations c/w low burden symptomatic PVCs (in 2021) who recently presented to [facility] for chest tightness and lightheadedness, found to have symptomatic PVCs and incessant runs of NSVT started on IV Amiodarone. She underwent a cardiac catheterization revealing 30% oLAD disease without additional CAD, and a TTE which showed preserved LV and RV function.     She was continued on Amiodarone, but experienced SE of fatigue and bradycardia, so Amiodarone was decreased and ultimately discontinued on 6/8/25.   No recurrence in palpitations to the severity as before. Denies chest pain, SOB, edema or orthopnea, but admits to fatigue, palpitations, and occasional near syncope with NSVT. She has off all medications for 1 month.  Now presents for EPS and VT ablation with Dr Yue Teran and PST from 7/1 reviewed. Denies interval change.  Confirms NPO > 8 hours    - IV Heparin lock  - STAT type and screen  - 2 units of PRBC on hold  - Maintain NPO status for procedure

## 2025-07-09 NOTE — DISCHARGE NOTE PROVIDER - NSDCCPTREATMENT_GEN_ALL_CORE_FT
PRINCIPAL PROCEDURE  Procedure: Electrophysiology study, with arrhythmogenic focus ablation, for premature ventricular contractions  Findings and Treatment: - Bruising at the groin, sometimes extending down the leg, and/or a small lump under the skin at the groin access site is normal and will resolve within 2 – 3 weeks.   - Occasional skipped beats or palpitations that last for a few beats are common and generally resolve within 1-2 months.   - You may walk and take stairs at a regular pace.   - Do not perform any exercise more strenuous than walking for 1 week.   - Do not strain or lift heavy objects for 1 week.  - You may shower the day after the procedure.  - Do not soak in water (such as tub baths, hot tubs, swimming, etc.) for 1 week.   - You may resume all other activities the day after the procedure.  Call your doctor if:   - you notice bleeding, redness, drainage, swelling, increased tenderness or a hot sensation around the catheter insertion site.   - your temperature is greater than 100 degrees F for more than 24 hours.  - your rapid heart rhythm returns.  - you have any questions or concerns regarding the procedure.  If significant bleeding and/or a large lump (the size of a golf ball or bigger) occurs:  - Lie flat and apply continuous direct pressure just above the puncture site for at least 10 minutes  - If the issue resolves, notify your physician immediately.    - If the bleeding cannot be controlled, please seek immediate medical attention.  If you experience increased difficulty breathing or chest pain, or if you faint or have dizzy spells, please seek immediate medical attention.

## 2025-07-09 NOTE — PROGRESS NOTE ADULT - SUBJECTIVE AND OBJECTIVE BOX
PROCEDURE(S):  Radiofrequency PVC Ablation    ELECTROPHYSIOLOGIST(S): Steve Turner MD         COMPLICATIONS:  none        DISPOSITION:  observation     CONDITION: stable  EBL: <15mL    Pt doing well s/p EPS and RF ablation of RVOT PVCs via RFV access. Denies complaint.       MEDICATIONS  (STANDING):  aspirin enteric coated 81 milliGRAM(s) Oral daily    MEDICATIONS  (PRN):  ondansetron Injectable 4 milliGRAM(s) IV Push every 4 hours PRN Nausea and/or Vomiting      Allergies    No Known Allergies    Intolerances          Exam:   T(C): 36.6 (07-09-25 @ 07:09), Max: 36.6 (07-09-25 @ 07:09)  HR: 66 (07-09-25 @ 11:20) (48 - 68)  BP: 98/68 (07-09-25 @ 11:10) (96/70 - 122/74)  RR: 14 (07-09-25 @ 11:20) (12 - 14)  SpO2: 100% (07-09-25 @ 11:20) (99% - 100%)    VSS, NAD, A&O x 3  Card: S1/S2, RRR, no m/g/r  Resp: lungs CTA b/l  Abd: S/NT/ND  Groins: hemostatic sutures in place; sites C/D/I; no bleeding, hematoma, erythema, exudate or edema  Ext: no edema; distal pulses intact    I/Os: net +     ECG:  SR @ 61bpm, narrow QRS    Assessment:   60-year-old female never smoker with a past medical history of palpitations c/w low burden symptomatic PVCs (in 2021) who recently presented to [facility] for chest tightness and lightheadedness, found to have symptomatic PVCs and incessant runs of NSVT started on IV Amiodarone. She underwent a cardiac catheterization revealing 30% oLAD disease without additional CAD, and a TTE which showed preserved LV and RV function.     Now status post uncomplicated RF ablation of RVOT PVCS via RFV access.  Hemostasis achieved with Vascade.    Plan:   Bedrest x 3 hours, then OOB with assistance and progress as tolerated.   Start Aspirin 81mg x 1 month.  Strict I/Os.  Please encourage incentive spirometry and ambulation once able.  Anticipated same day discharge later today, pending post procedure course and recovery

## 2025-07-09 NOTE — DISCHARGE NOTE PROVIDER - CARE PROVIDER_API CALL
Steve Turner  Clinical Cardiac Electrophysiology  [address] [place] [id number]  Phone: [phone]  Fax: [phone]  Follow Up Time: 1 month

## 2025-07-09 NOTE — DISCHARGE NOTE PROVIDER - NSDCFUSCHEDAPPT_GEN_ALL_CORE_FT
[facility]  ELECTROPH [address] [place] [address]  Scheduled Appointment: 08/08/2025

## 2025-07-09 NOTE — DISCHARGE NOTE PROVIDER - HOSPITAL COURSE
60-year-old female never smoker with a past medical history of palpitations c/w low burden symptomatic PVCs (in 2021) who recently presented to [facility] for chest tightness and lightheadedness, found to have symptomatic PVCs and incessant runs of NSVT started on IV Amiodarone. She underwent a cardiac catheterization revealing 30% oLAD disease without additional CAD, and a TTE which showed preserved LV and RV function.     Now status post uncomplicated RF ablation of RVOT PVCS via RFV access.  Hemostasis achieved with Vascade.  The patient was observed per post procedure protocol, then discharged home with a plan for outpatient follow up.

## 2025-07-09 NOTE — ASU PATIENT PROFILE, ADULT - FALL HARM RISK - UNIVERSAL INTERVENTIONS
Bed in lowest position, wheels locked, appropriate side rails in place/Call bell, personal items and telephone in reach/Instruct patient to call for assistance before getting out of bed or chair/Non-slip footwear when patient is out of bed/[place] to call system/Physically safe environment - no spills, clutter or unnecessary equipment/Purposeful Proactive Rounding/Room/bathroom lighting operational, light cord in reach

## 2025-07-29 PROCEDURE — 93248 EXT ECG>7D<15D REV&INTERPJ: CPT

## 2025-08-08 ENCOUNTER — APPOINTMENT (OUTPATIENT)
Age: 60
End: 2025-08-08
Payer: COMMERCIAL

## 2025-08-08 VITALS
BODY MASS INDEX: 19.99 KG/M2 | DIASTOLIC BLOOD PRESSURE: 80 MMHG | WEIGHT: 120 LBS | OXYGEN SATURATION: 100 % | HEART RATE: 57 BPM | SYSTOLIC BLOOD PRESSURE: 110 MMHG | HEIGHT: 65 IN

## 2025-08-08 DIAGNOSIS — E78.5 HYPERLIPIDEMIA, UNSPECIFIED: ICD-10-CM

## 2025-08-08 DIAGNOSIS — I49.3 VENTRICULAR PREMATURE DEPOLARIZATION: ICD-10-CM

## 2025-08-08 PROCEDURE — 93000 ELECTROCARDIOGRAM COMPLETE: CPT

## 2025-08-08 PROCEDURE — 99213 OFFICE O/P EST LOW 20 MIN: CPT | Mod: 25

## 2025-08-08 RX ORDER — ASPIRIN 81 MG
81 TABLET, DELAYED RELEASE (ENTERIC COATED) ORAL DAILY
Refills: 0 | Status: ACTIVE | COMMUNITY

## 2025-08-09 PROBLEM — E78.5 HYPERLIPIDEMIA: Status: ACTIVE | Noted: 2025-06-10

## 2025-08-09 PROBLEM — I49.3 PVC (PREMATURE VENTRICULAR CONTRACTION): Status: ACTIVE | Noted: 2025-06-10

## 2025-08-11 PROBLEM — R73.03 PREDIABETES: Chronic | Status: ACTIVE | Noted: 2025-07-01

## 2025-08-11 PROBLEM — I47.29 OTHER VENTRICULAR TACHYCARDIA: Chronic | Status: ACTIVE | Noted: 2025-07-01

## 2025-08-11 PROBLEM — I49.3 VENTRICULAR PREMATURE DEPOLARIZATION: Chronic | Status: ACTIVE | Noted: 2025-07-01

## 2025-08-11 PROBLEM — E78.5 HYPERLIPIDEMIA, UNSPECIFIED: Chronic | Status: ACTIVE | Noted: 2025-07-01
